# Patient Record
Sex: MALE | Race: WHITE | NOT HISPANIC OR LATINO | Employment: FULL TIME | URBAN - METROPOLITAN AREA
[De-identification: names, ages, dates, MRNs, and addresses within clinical notes are randomized per-mention and may not be internally consistent; named-entity substitution may affect disease eponyms.]

---

## 2020-01-03 ENCOUNTER — CLINICAL SUPPORT (OUTPATIENT)
Dept: GASTROENTEROLOGY | Facility: CLINIC | Age: 55
End: 2020-01-03

## 2020-01-03 DIAGNOSIS — Z12.11 SCREENING FOR COLON CANCER: Primary | ICD-10-CM

## 2020-01-03 RX ORDER — AMLODIPINE BESYLATE 5 MG/1
5 TABLET ORAL DAILY
COMMUNITY

## 2020-01-03 RX ORDER — LISINOPRIL 20 MG/1
20 TABLET ORAL DAILY
COMMUNITY

## 2020-01-07 ENCOUNTER — TELEPHONE (OUTPATIENT)
Dept: GASTROENTEROLOGY | Facility: CLINIC | Age: 55
End: 2020-01-07

## 2020-01-07 NOTE — TELEPHONE ENCOUNTER
Rx not forwarded to provider; called verbal into [place] [facility]; [facility] to pt informing him I did so

## 2020-01-10 PROCEDURE — 88305 TISSUE EXAM BY PATHOLOGIST: CPT | Performed by: PATHOLOGY

## 2020-01-13 ENCOUNTER — LAB REQUISITION (OUTPATIENT)
Dept: LAB | Facility: HOSPITAL | Age: 55
End: 2020-01-13
Payer: COMMERCIAL

## 2020-01-13 DIAGNOSIS — K64.0 FIRST DEGREE HEMORRHOIDS: ICD-10-CM

## 2020-01-13 DIAGNOSIS — K62.1 RECTAL POLYP: ICD-10-CM

## 2020-01-13 DIAGNOSIS — Z85.038 PERSONAL HISTORY OF OTHER MALIGNANT NEOPLASM OF LARGE INTESTINE: ICD-10-CM

## 2020-01-22 NOTE — RESULT ENCOUNTER NOTE
Spoke with patient regarding polyp results benign    Three year recall based on his previous history

## 2021-09-30 ENCOUNTER — TELEPHONE (OUTPATIENT)
Dept: GASTROENTEROLOGY | Facility: CLINIC | Age: 56
End: 2021-09-30

## 2021-09-30 NOTE — TELEPHONE ENCOUNTER
Took call from pt to schedule colonoscopy at the request of Dr Anna Gruber for low iron count  He saw Dr Anna Gruebr yesterday in the office- I called over and asked for the note to be faxed to us for review  Patient states he will begin iron infusions 10/7  Next colon recall date is 2023  In the meantime, I have him scheduled for an office visit with Herm on 10/13  Patient aware he may need to be seen in the office to discuss, but I will send a note to OT to advise

## 2021-10-13 ENCOUNTER — OFFICE VISIT (OUTPATIENT)
Dept: GASTROENTEROLOGY | Facility: CLINIC | Age: 56
End: 2021-10-13
Payer: COMMERCIAL

## 2021-10-13 VITALS
HEIGHT: 74 IN | SYSTOLIC BLOOD PRESSURE: 128 MMHG | DIASTOLIC BLOOD PRESSURE: 82 MMHG | BODY MASS INDEX: 33.34 KG/M2 | WEIGHT: 259.8 LBS

## 2021-10-13 DIAGNOSIS — Z85.038 HISTORY OF COLON CANCER: ICD-10-CM

## 2021-10-13 DIAGNOSIS — D64.9 ANEMIA, UNSPECIFIED TYPE: Primary | ICD-10-CM

## 2021-10-13 DIAGNOSIS — Z85.038 HISTORY OF COLON CANCER: Primary | ICD-10-CM

## 2021-10-13 PROCEDURE — 99214 OFFICE O/P EST MOD 30 MIN: CPT | Performed by: NURSE PRACTITIONER

## 2021-10-13 RX ORDER — TADALAFIL 5 MG/1
TABLET ORAL
COMMUNITY
Start: 2021-09-03

## 2021-10-14 RX ORDER — SODIUM PICOSULFATE, MAGNESIUM OXIDE, AND ANHYDROUS CITRIC ACID 10; 3.5; 12 MG/160ML; G/160ML; G/160ML
LIQUID ORAL
Qty: 320 ML | Refills: 0 | Status: SHIPPED | COMMUNITY
Start: 2021-10-14 | End: 2021-11-02 | Stop reason: HOSPADM

## 2021-11-01 ENCOUNTER — ANESTHESIA EVENT (OUTPATIENT)
Dept: GASTROENTEROLOGY | Facility: AMBULATORY SURGERY CENTER | Age: 56
End: 2021-11-01

## 2021-11-02 ENCOUNTER — ANESTHESIA (OUTPATIENT)
Dept: GASTROENTEROLOGY | Facility: AMBULATORY SURGERY CENTER | Age: 56
End: 2021-11-02

## 2021-11-02 ENCOUNTER — HOSPITAL ENCOUNTER (OUTPATIENT)
Dept: GASTROENTEROLOGY | Facility: AMBULATORY SURGERY CENTER | Age: 56
Discharge: HOME | End: 2021-11-02
Payer: COMMERCIAL

## 2021-11-02 VITALS
HEART RATE: 78 BPM | DIASTOLIC BLOOD PRESSURE: 90 MMHG | WEIGHT: 249 LBS | BODY MASS INDEX: 31.95 KG/M2 | OXYGEN SATURATION: 97 % | HEIGHT: 74 IN | RESPIRATION RATE: 20 BRPM | SYSTOLIC BLOOD PRESSURE: 150 MMHG | TEMPERATURE: 98.9 F

## 2021-11-02 DIAGNOSIS — D64.9 ANEMIA, UNSPECIFIED TYPE: ICD-10-CM

## 2021-11-02 DIAGNOSIS — Z85.038 HISTORY OF COLON CANCER: ICD-10-CM

## 2021-11-02 PROCEDURE — 45378 DIAGNOSTIC COLONOSCOPY: CPT | Performed by: INTERNAL MEDICINE

## 2021-11-02 PROCEDURE — 43239 EGD BIOPSY SINGLE/MULTIPLE: CPT | Performed by: INTERNAL MEDICINE

## 2021-11-02 PROCEDURE — 88305 TISSUE EXAM BY PATHOLOGIST: CPT | Performed by: PATHOLOGY

## 2021-11-02 RX ORDER — SODIUM CHLORIDE, SODIUM LACTATE, POTASSIUM CHLORIDE, CALCIUM CHLORIDE 600; 310; 30; 20 MG/100ML; MG/100ML; MG/100ML; MG/100ML
50 INJECTION, SOLUTION INTRAVENOUS CONTINUOUS
Status: DISCONTINUED | OUTPATIENT
Start: 2021-11-02 | End: 2021-11-06 | Stop reason: HOSPADM

## 2021-11-02 RX ORDER — PROPOFOL 10 MG/ML
INJECTION, EMULSION INTRAVENOUS AS NEEDED
Status: DISCONTINUED | OUTPATIENT
Start: 2021-11-02 | End: 2021-11-02

## 2021-11-02 RX ORDER — LIDOCAINE HYDROCHLORIDE 10 MG/ML
INJECTION, SOLUTION EPIDURAL; INFILTRATION; INTRACAUDAL; PERINEURAL AS NEEDED
Status: DISCONTINUED | OUTPATIENT
Start: 2021-11-02 | End: 2021-11-02

## 2021-11-02 RX ORDER — PANTOPRAZOLE SODIUM 40 MG/1
40 TABLET, DELAYED RELEASE ORAL DAILY
Qty: 30 TABLET | Refills: 5 | Status: SHIPPED | OUTPATIENT
Start: 2021-11-02 | End: 2022-01-25

## 2021-11-02 RX ADMIN — PROPOFOL 50 MG: 10 INJECTION, EMULSION INTRAVENOUS at 09:15

## 2021-11-02 RX ADMIN — PROPOFOL 50 MG: 10 INJECTION, EMULSION INTRAVENOUS at 09:17

## 2021-11-02 RX ADMIN — PROPOFOL 50 MG: 10 INJECTION, EMULSION INTRAVENOUS at 09:30

## 2021-11-02 RX ADMIN — LIDOCAINE HYDROCHLORIDE 100 MG: 10 INJECTION, SOLUTION EPIDURAL; INFILTRATION; INTRACAUDAL; PERINEURAL at 09:12

## 2021-11-02 RX ADMIN — PROPOFOL 40 MG: 10 INJECTION, EMULSION INTRAVENOUS at 09:25

## 2021-11-02 RX ADMIN — PROPOFOL 200 MG: 10 INJECTION, EMULSION INTRAVENOUS at 09:12

## 2021-11-02 RX ADMIN — SODIUM CHLORIDE, SODIUM LACTATE, POTASSIUM CHLORIDE, CALCIUM CHLORIDE 50 ML/HR: 600; 310; 30; 20 INJECTION, SOLUTION INTRAVENOUS at 08:55

## 2021-11-02 RX ADMIN — PROPOFOL 50 MG: 10 INJECTION, EMULSION INTRAVENOUS at 09:19

## 2021-11-02 RX ADMIN — PROPOFOL 50 MG: 10 INJECTION, EMULSION INTRAVENOUS at 09:23

## 2022-01-06 ENCOUNTER — OFFICE VISIT (OUTPATIENT)
Dept: GASTROENTEROLOGY | Facility: CLINIC | Age: 57
End: 2022-01-06
Payer: COMMERCIAL

## 2022-01-06 VITALS
DIASTOLIC BLOOD PRESSURE: 100 MMHG | HEIGHT: 74 IN | BODY MASS INDEX: 33.88 KG/M2 | WEIGHT: 264 LBS | SYSTOLIC BLOOD PRESSURE: 164 MMHG

## 2022-01-06 DIAGNOSIS — K22.70 BARRETT'S ESOPHAGUS WITHOUT DYSPLASIA: ICD-10-CM

## 2022-01-06 DIAGNOSIS — Z85.038 PERSONAL HISTORY OF COLON CANCER: Primary | ICD-10-CM

## 2022-01-06 PROCEDURE — 99214 OFFICE O/P EST MOD 30 MIN: CPT | Performed by: REGISTERED NURSE

## 2022-01-06 NOTE — PROGRESS NOTES
[address] [facility] - Outpatient Follow-up Note  Isiah Toledo 61 y o  male MRN: [id number]  Encounter: [id number]    ASSESSMENT AND PLAN:      1  Reeder's esophagus without dysplasia  Diagnosed on  11/02/2021  Reflux symptoms controlled on pantoprazole 40 mg daily  Patient to continue taking  Three year recall on EGD  Due  11/2024     2  Personal history of colon cancer  Diagnosed in 2019  Status post resection as well as chemo  Patient is doing well  Last colonoscopy 2021 negative for any colon polyps  Three year recall  Due 11/2020 for the time of EGD    3  Anemia  Followed by Dr Sigrid Waters at [address] [facility]  He was referred here for colonoscopy and upper endoscopy due to drop in hemoglobin and ferritin  He is currently receiving iron transfusions  Next lab draw in February  He will have his provider [facility] on the results and the CT scan  Followup Appointment:  6 months  ______________________________________________________________________    Chief Complaint   Patient presents with    Follow-up     med check     HPI:  58-year-old male here for med check  He was started on pantoprazole when diagnosed with Reeder's esophagus  Patient was curious if he should continue the medication  I explained to him that it is important to continue this medication to prevent further acid reflux which could cause dysplasia  He understands and is in agreement  GERD symptoms are in control right now with no breakthrough symptoms  Of note patient also has a personal history of colon cancer  No concerns at this time  Daily formed bowel movements  Denies any blood in his stool  Denies any abdominal pain  His appetite is stable and he has not experienced any significant weight loss  He also has a history of anemia and is followed by Dr Sigrid Waters at [address] [facility]    We performed an upper and lower endoscopy which were negative for any GI bleed  He is receiving iron infusions and will be getting his next lab and CT scan done in February or March  He will have us cc'd on the results  Historical Information   Past Medical History:   Diagnosis Date    Colon cancer ([facility] )     Colon polyp     Diabetes mellitus ([facility] )     Hypertension      Past Surgical History:   Procedure Laterality Date    COLON SIGMOID RESECTION      COLON SURGERY      COLONOSCOPY  01/08/2019    WISDOM TOOTH EXTRACTION       Social History     Substance and Sexual Activity   Alcohol Use Yes    Alcohol/week: 2 0 standard drinks    Types: 2 Cans of beer per week     Social History     Substance and Sexual Activity   Drug Use Never     Social History     Tobacco Use   Smoking Status Never Smoker   Smokeless Tobacco Never Used     Family History   Problem Relation Age of Onset    No Known Problems Mother     No Known Problems Father          Current Outpatient Medications:     amLODIPine (NORVASC) 5 mg tablet    lisinopril (ZESTRIL) 20 mg tablet    metFORMIN (GLUCOPHAGE) 1000 MG tablet    pantoprazole (PROTONIX) 40 mg tablet    tadalafil (CIALIS) 5 MG tablet  No Known Allergies  Reviewed medications and allergies and updated as indicated    PHYSICAL EXAM:    Blood pressure 164/100, height 6' 2" (1 88 m), weight 120 kg (264 lb)  Body mass index is 33 9 kg/m²  General Appearance: NAD, cooperative, alert  Eyes: Anicteric, PERRLA, EOMI  ENT:  Normocephalic, atraumatic, normal mucosa  Neck:  Supple, symmetrical, trachea midline  Resp:  Clear to auscultation bilaterally; no rales, rhonchi or wheezing; respirations unlabored   CV:  S1 S2, Regular rate and rhythm; no murmur, rub, or gallop  GI:  Soft, non-tender, non-distended; normal bowel sounds; no masses, no organomegaly   Rectal: Deferred  Musculoskeletal: No cyanosis, clubbing or edema  Normal ROM    Skin:  No jaundice, rashes, or lesions   Heme/Lymph: No palpable cervical lymphadenopathy  Psych: Normal affect, good eye contact  Neuro: No gross deficits, AAOx3    Lab Results:   No results found for: WBC, HGB, HCT, MCV, PLT  No results found for: NA, K, CL, CO2, ANIONGAP, BUN, CREATININE, GLUCOSE, GLUF, CALCIUM, CORRECTEDCA, AST, ALT, ALKPHOS, PROT, BILITOT, EGFR  No results found for: IRON, TIBC, FERRITIN  No results found for: LIPASE    Radiology Results:   No results found

## 2022-01-25 DIAGNOSIS — D64.9 ANEMIA, UNSPECIFIED TYPE: ICD-10-CM

## 2022-01-25 RX ORDER — PANTOPRAZOLE SODIUM 40 MG/1
40 TABLET, DELAYED RELEASE ORAL DAILY
Qty: 90 TABLET | Refills: 3 | Status: SHIPPED | OUTPATIENT
Start: 2022-01-25

## 2022-01-25 NOTE — TELEPHONE ENCOUNTER
Pt calling to request refill of Pantoprazole 40 to [facility]; has been getting 1-mo supply but requests 3-mo supply for considerable savings  If Nidia Meo # [id number]

## 2022-06-06 LAB
CREAT ?TM UR-SCNC: 166 UMOL/L
EXT MICROALBUMIN URINE RANDOM: 2
HBA1C MFR BLD HPLC: 6.1 %
MICROALBUMIN/CREAT UR: 12 MG/G{CREAT}

## 2022-07-25 ENCOUNTER — OFFICE VISIT (OUTPATIENT)
Dept: GASTROENTEROLOGY | Facility: CLINIC | Age: 57
End: 2022-07-25
Payer: COMMERCIAL

## 2022-07-25 VITALS
WEIGHT: 254.4 LBS | DIASTOLIC BLOOD PRESSURE: 80 MMHG | HEIGHT: 74 IN | BODY MASS INDEX: 32.65 KG/M2 | SYSTOLIC BLOOD PRESSURE: 142 MMHG

## 2022-07-25 DIAGNOSIS — K22.719 BARRETT'S ESOPHAGUS WITH DYSPLASIA: Primary | ICD-10-CM

## 2022-07-25 DIAGNOSIS — Z85.038 HISTORY OF MALIGNANT NEOPLASM OF COLON: ICD-10-CM

## 2022-07-25 DIAGNOSIS — D64.9 ANEMIA, UNSPECIFIED TYPE: ICD-10-CM

## 2022-07-25 PROBLEM — K22.70 BARRETT'S ESOPHAGUS: Status: ACTIVE | Noted: 2022-07-25

## 2022-07-25 PROCEDURE — 99213 OFFICE O/P EST LOW 20 MIN: CPT | Performed by: REGISTERED NURSE

## 2022-07-25 RX ORDER — ATORVASTATIN CALCIUM 10 MG/1
10 TABLET, FILM COATED ORAL DAILY
COMMUNITY
Start: 2022-05-11

## 2022-07-25 NOTE — PROGRESS NOTES
[facility] - Outpatient Follow-up Note  Ambrosio Monteiro 61 y o  male MRN: [id number]  Encounter: [id number]    ASSESSMENT AND PLAN:      1  Reeder's esophagus without dysplasia  History of Reeder's esophagus diagnosed on EGD 11/2021  Reflux symptoms well controlled on pantoprazole 40 mg daily  Three year recall due for EGD, 11/2024  -continue pantoprazole 40 mg daily    2  History of malignant neoplasm of colon  Diagnosed in 2019  Status post resection as well as chemo  Patient is doing well  Last colonoscopy 2021 negative for any colon polyps  three year recall  -next colonoscopy due 11/2024    3  Anemia, unspecified type  Patient is followed by Dr Carlotta Hull at [address] [place] [address] Hematology  He has received 2 iron transfusions in the past   CBC and iron panel from March/2022 within normal limits  He will continue to follow with him every 6 months  Followup Appointment:  1 year  ______________________________________________________________________    Chief Complaint   Patient presents with    GERD     Follow up     HPI:  65-year-old male presents for follow-up  He was started on pantoprazole when diagnosed with Reeder's esophagus last year  Symptoms have been well controlled with once daily dosing  He denies any dysphagia or epigastric pain  He does have a personal history of colon cancer status post resection and chemo  Denies any changes in his bowel habits  Denies any rectal bleeding  Denies any abdominal pain  His appetite is good and his weight is stable      Historical Information   Past Medical History:   Diagnosis Date    Colon cancer ([facility] )     Colon polyp     Diabetes mellitus ([facility] )     Hyperlipidemia     Hypertension      Past Surgical History:   Procedure Laterality Date    COLON SIGMOID RESECTION      COLON SURGERY      COLONOSCOPY  01/08/2019    WISDOM TOOTH EXTRACTION       Social History     Substance and Sexual Activity   Alcohol Use Yes    Alcohol/week: 2 0 standard drinks    Types: 2 Cans of beer per week     Social History     Substance and Sexual Activity   Drug Use Never     Social History     Tobacco Use   Smoking Status Never Smoker   Smokeless Tobacco Never Used     Family History   Problem Relation Age of Onset    No Known Problems Mother     No Known Problems Father          Current Outpatient Medications:     amLODIPine (NORVASC) 5 mg tablet    atorvastatin (LIPITOR) 10 mg tablet    lisinopril (ZESTRIL) 20 mg tablet    metFORMIN (GLUCOPHAGE) 1000 MG tablet    pantoprazole (PROTONIX) 40 mg tablet    tadalafil (CIALIS) 5 MG tablet  No Known Allergies  Reviewed medications and allergies and updated as indicated    PHYSICAL EXAM:    Blood pressure 142/80, height 6' 2" (1 88 m), weight 115 kg (254 lb 6 4 oz)  Body mass index is 32 66 kg/m²  General Appearance: NAD, cooperative, alert  Eyes: Anicteric, PERRLA, EOMI  ENT:  Normocephalic, atraumatic, normal mucosa  Neck:  Supple, symmetrical, trachea midline  Resp:  Clear to auscultation bilaterally; no rales, rhonchi or wheezing; respirations unlabored   CV:  S1 S2, Regular rate and rhythm; no murmur, rub, or gallop  GI:  Soft, non-tender, non-distended; normal bowel sounds; no masses, no organomegaly   Rectal: Deferred  Musculoskeletal: No cyanosis, clubbing or edema  Normal ROM  Skin:  No jaundice, rashes, or lesions   Heme/Lymph: No palpable cervical lymphadenopathy  Psych: Normal affect, good eye contact  Neuro: No gross deficits, AAOx3    Lab Results:   Reviewed     Radiology Results:   No results found

## 2023-01-09 DIAGNOSIS — D64.9 ANEMIA, UNSPECIFIED TYPE: ICD-10-CM

## 2023-01-09 RX ORDER — PANTOPRAZOLE SODIUM 40 MG/1
TABLET, DELAYED RELEASE ORAL
Qty: 90 TABLET | Refills: 0 | Status: SHIPPED | OUTPATIENT
Start: 2023-01-09

## 2023-07-03 DIAGNOSIS — D64.9 ANEMIA, UNSPECIFIED TYPE: ICD-10-CM

## 2023-07-03 RX ORDER — PANTOPRAZOLE SODIUM 40 MG/1
TABLET, DELAYED RELEASE ORAL
Qty: 90 TABLET | Refills: 0 | Status: SHIPPED | OUTPATIENT
Start: 2023-07-03

## 2023-10-11 DIAGNOSIS — D64.9 ANEMIA, UNSPECIFIED TYPE: ICD-10-CM

## 2023-10-11 RX ORDER — PANTOPRAZOLE SODIUM 40 MG/1
TABLET, DELAYED RELEASE ORAL
Qty: 30 TABLET | Refills: 0 | Status: SHIPPED | OUTPATIENT
Start: 2023-10-11 | End: 2023-10-20 | Stop reason: SDUPTHER

## 2023-10-20 ENCOUNTER — OFFICE VISIT (OUTPATIENT)
Age: 58
End: 2023-10-20
Payer: COMMERCIAL

## 2023-10-20 VITALS
BODY MASS INDEX: 34.7 KG/M2 | DIASTOLIC BLOOD PRESSURE: 98 MMHG | SYSTOLIC BLOOD PRESSURE: 162 MMHG | HEIGHT: 74 IN | WEIGHT: 270.4 LBS

## 2023-10-20 DIAGNOSIS — Z85.038 HISTORY OF MALIGNANT NEOPLASM OF COLON: ICD-10-CM

## 2023-10-20 DIAGNOSIS — K22.70 BARRETT'S ESOPHAGUS WITHOUT DYSPLASIA: Primary | ICD-10-CM

## 2023-10-20 DIAGNOSIS — D64.9 ANEMIA, UNSPECIFIED TYPE: ICD-10-CM

## 2023-10-20 PROCEDURE — 99214 OFFICE O/P EST MOD 30 MIN: CPT | Performed by: NURSE PRACTITIONER

## 2023-10-20 RX ORDER — PANTOPRAZOLE SODIUM 40 MG/1
40 TABLET, DELAYED RELEASE ORAL DAILY
Qty: 90 TABLET | Refills: 3 | Status: SHIPPED | OUTPATIENT
Start: 2023-10-20

## 2023-10-20 NOTE — PROGRESS NOTES
[facility] - Outpatient Follow-up Note  Fernanda Lai 60 y.o. male MRN: [id number]  Encounter: [id number]    ASSESSMENT AND PLAN:      1. Reeder's esophagus without dysplasia  2. GERD  Reflux symptoms well controlled on pantoprazole 40 mg daily  Denies breakthrough symptoms. No chest pain or epigastric pain  No alarm symptoms  EGD 11/2021 revealed erosive esophagitis, biopsies positive for Reeder's  -Continue pantoprazole 40 mg daily, refills provided  -Discussed GERD diet and lifestyle modifications  -Due for EGD recall November 2024    3. History of malignant neoplasm of colon  Diagnosed with colon cancer 2019, s/p resection and adjuvant chemotherapy  Follows with oncology for surveillance which has been negative since surgery  Colonoscopy 11/2021 was normal, 3-year recall recommended  -Due for surveillance colonoscopy November 2024      Followup Appointment: 1 year ______________________________________________________________________    Chief Complaint   Patient presents with    Follow-up     Pt is here for annual follow up and medication review, pantoprazole works well, symptoms under control. Pt noted 2 weeks ago he forgot his pantoprazole while away and took Nexium for 3 days. HPI: Presents in follow-up for history of GERD, Reeder's esophagus and colon cancer. Overall is feeling well and offers no complaints today. He is currently on pantoprazole 40 mg daily with good control of reflux symptoms. Denies breakthrough symptoms. No dysphagia, nausea or vomiting. Appetite is good and his weight has been stable  EGD 11/2021 showed moderate erosive esophagitis in the lower third of the esophagus, biopsy positive for Reeder's    He has history of colon cancer diagnosed in 2019.   S/p resection and adjuvant chemotherapy  Last colonoscopy 11/2021 was negative for polyps or lesion  Continues to follow with oncology with periodic scans and lab tests which have been negative  Denies recent change in bowel habits. Occasional diarrhea which he attributes to metformin  Usually with formed brown stool daily. Denies melena, hematochezia or rectal bleeding    Historical Information   Past Medical History:   Diagnosis Date    Colon cancer ([address])     Colon polyp     Diabetes mellitus ([address])     Hernia Jan 2019    Hyperlipidemia     Hypertension      Past Surgical History:   Procedure Laterality Date    ABDOMINAL SURGERY  Jan 2019    COLON SIGMOID RESECTION      COLON SURGERY      COLONOSCOPY  01/08/2019    COLONOSCOPY  Oct 2021    WISDOM TOOTH EXTRACTION       Social History     Substance and Sexual Activity   Alcohol Use Yes    Alcohol/week: 2.0 standard drinks of alcohol    Types: 2 Cans of beer per week     Social History     Substance and Sexual Activity   Drug Use Never     Social History     Tobacco Use   Smoking Status Never   Smokeless Tobacco Never     Family History   Problem Relation Age of Onset    Diabetes Mother     No Known Problems Father          Current Outpatient Medications:     amLODIPine (NORVASC) 5 mg tablet    atorvastatin (LIPITOR) 10 mg tablet    lisinopril (ZESTRIL) 20 mg tablet    metFORMIN (GLUCOPHAGE) 1000 MG tablet    pantoprazole (PROTONIX) 40 mg tablet    tadalafil (CIALIS) 5 MG tablet  No Known Allergies  Reviewed medications and allergies and updated as indicated    PHYSICAL EXAM:    Blood pressure 162/98, height 6' 2" (1.88 m), weight 123 kg (270 lb 6.4 oz). Body mass index is 34.72 kg/m². General Appearance: NAD, cooperative, alert  Eyes: Anicteric  ENT:  Normocephalic, atraumatic, normal mucosa. Neck:  Supple, symmetrical, trachea midline  Resp:  Clear to auscultation bilaterally; no rales, rhonchi or wheezing; respirations unlabored   CV:  S1 S2, Regular rate and rhythm; no murmur, rub, or gallop.   GI:  Soft, non-tender, non-distended; normal bowel sounds; no masses, no organomegaly   Rectal: Deferred  Musculoskeletal: No cyanosis, clubbing or edema. Normal ROM.   Skin:  No jaundice, rashes, or lesions   Psych: Normal affect, good eye contact  Neuro: No gross deficits, AAOx3

## 2023-12-28 ENCOUNTER — TELEPHONE (OUTPATIENT)
Age: 58
End: 2023-12-28

## 2023-12-28 NOTE — TELEPHONE ENCOUNTER
PA for Pantoprazole    Submitted via  []CMM-KEY   [x]Erin-Case ID # [id number]   []Faxed to plan   []Other website      Office notes sent, clinical questions answered. Awaiting determination

## 2023-12-28 NOTE — TELEPHONE ENCOUNTER
Pt returned call and reached refill line. He was informed of the PA denial. Please try to reach him again to discuss next steps.

## 2023-12-28 NOTE — TELEPHONE ENCOUNTER
PA for Pantoprazole Denied    Reason:                  Message sent to clinical pool No      Denial letter scanned into Media Yes    Appeal started No

## 2023-12-29 NOTE — TELEPHONE ENCOUNTER
Spoke to patient.  He had already investigated [facility] and is planning on using that.  Will call if he needs prescription.

## 2024-01-04 DIAGNOSIS — D64.9 ANEMIA, UNSPECIFIED TYPE: ICD-10-CM

## 2024-01-05 DIAGNOSIS — D64.9 ANEMIA, UNSPECIFIED TYPE: ICD-10-CM

## 2024-01-05 RX ORDER — PANTOPRAZOLE SODIUM 40 MG/1
40 TABLET, DELAYED RELEASE ORAL DAILY
Qty: 90 TABLET | Refills: 1 | Status: SHIPPED | OUTPATIENT
Start: 2024-01-05

## 2024-01-05 RX ORDER — PANTOPRAZOLE SODIUM 40 MG/1
40 TABLET, DELAYED RELEASE ORAL DAILY
Qty: 90 TABLET | Refills: 0 | OUTPATIENT
Start: 2024-01-05

## 2024-01-05 NOTE — TELEPHONE ENCOUNTER
Reason for call: NOT DUPLICATE pt says he does NOT need a PRIOR AUTH he will be paying out of pocket and use [facility]! He asked [facility] to send a script to us and it was denied.   Please note. NOT DUPLICATE!  [x] Refill   [] Prior Auth  [] Other:     Office:   [] PCP/Provider -   [x] Specialty/Provider - Hipolito CNRP    Medication: pantoprazole    Dose/Frequency: 40 mg/ daily    Quantity: 90D w refill    Pharmacy: [facility]     Does the patient have enough for 3 days?   [] Yes   [x] No - Send as HP to POD

## 2024-05-03 LAB
CREAT ?TM UR-SCNC: 121 UMOL/L
EXT ALBUMIN URINE RANDOM: 4.6
HBA1C MFR BLD HPLC: 9.2 %
MICROALBUMIN/CREAT UR: 38 MG/G{CREAT}

## 2024-05-22 ENCOUNTER — OFFICE VISIT (OUTPATIENT)
Age: 59
End: 2024-05-22
Payer: COMMERCIAL

## 2024-05-22 VITALS
DIASTOLIC BLOOD PRESSURE: 88 MMHG | BODY MASS INDEX: 34.55 KG/M2 | SYSTOLIC BLOOD PRESSURE: 152 MMHG | WEIGHT: 269.2 LBS | HEIGHT: 74 IN

## 2024-05-22 DIAGNOSIS — D64.9 ANEMIA, UNSPECIFIED TYPE: ICD-10-CM

## 2024-05-22 DIAGNOSIS — K22.70 BARRETT'S ESOPHAGUS WITHOUT DYSPLASIA: Primary | ICD-10-CM

## 2024-05-22 DIAGNOSIS — Z85.038 HISTORY OF MALIGNANT NEOPLASM OF COLON: ICD-10-CM

## 2024-05-22 PROCEDURE — 99214 OFFICE O/P EST MOD 30 MIN: CPT | Performed by: NURSE PRACTITIONER

## 2024-05-22 RX ORDER — PANTOPRAZOLE SODIUM 40 MG/1
40 TABLET, DELAYED RELEASE ORAL DAILY
Qty: 90 TABLET | Refills: 3 | Status: SHIPPED | OUTPATIENT
Start: 2024-05-22

## 2024-05-22 RX ORDER — NEBIVOLOL 10 MG/1
1 TABLET ORAL DAILY
COMMUNITY

## 2024-05-22 NOTE — LETTER
May 23, 2024     BRANDAN Patel  [address] [place] [address]    Patient: Patrick Willoughby   YOB: 1965   Date of Visit: 5/22/2024       Dear Dr. Alcantara:    Thank you for referring Patrick Willoughby to me for evaluation. Below are my notes for this consultation.    If you have questions, please do not hesitate to call me. I look forward to following your patient along with you.         Sincerely,        BRANDAN Kirk        CC: No Recipients    BRANDAN Kirk  5/22/2024  2:10 PM  Addendum  [facility] - Outpatient Follow-up Note  Patrick Willoughby 58 y.o. male MRN: [id number]  Encounter: [id number]    ASSESSMENT AND PLAN:      1. Reeder's esophagus without dysplasia  2.  GERD  EGD 11/21 revealed erosive esophagitis, biopsy positive for Reeder's  Remains on pantoprazole 40 mg daily  GERD symptoms well-controlled, no breakthrough symptoms, no alarm symptoms  EGD recall recommended in 3 years  -Scheduled for EGD at [facility]  -Continue pantoprazole 40 mg daily, refill provided  -Reviewed GERD diet and lifestyle modifications    3. History of malignant neoplasm of colon  Diagnosed in 2019, underwent left hemicolectomy and adjuvant chemotherapy  Continues to follow with [facility] for surveillance which has been negative  Colonoscopy 11/2021 normal, recall recommended in November 2024  Recent CEA slightly elevated 3.2, previously 2.8  Hemoglobin decreased to 11.9 from 13.0 in November 2023  Oncology recommending surveillance colonoscopy  -Scheduled for colonoscopy at [facility]      Followup Appointment: 1 year  ______________________________________________________________________    Chief Complaint   Patient presents with   • Follow-up     Pt states he needs to schedule screening EGD/Colonoscopy per Dr. Lezama due to increased CEA. Pt needs refill on pantoprazole, working well.      HPI: Patient presents in follow-up for history  of Reeder's esophagus, GERD and colon cancer diagnosed in 2019, s/p surgical resection and adjuvant chemotherapy    He continues to follow with [facility] oncology.  Recent office visit and labs revealed slight increase in his CEA to 3.2 from 2.9 in November 2023.    Hemoglobin also noted to be decreased at 11.9, previously 13.0 in November 2023.  Most recent CT scan November 2023 was negative for acute findings    Patient denies recent acute change in bowel habits but does notice that he is having more frequent episodes of diarrhea 2-3 times per week.  Questions that may be due to medication specifically metformin.  Denies constipation, melena.  He does have occasional bright red blood with wiping which he attributes to hemorrhoids  No abdominal pain    Reports good control of reflux symptoms on Protonix  Denies breakthrough GERD symptoms.  Appetite is good and his weight has been stable    Historical Information  Past Medical History:   Diagnosis Date   • Colon cancer (HCC)    • Colon polyp    • Diabetes mellitus ([facility]CC)    • Hernia Jan 2019   • Hyperlipidemia    • Hypertension      Past Surgical History:   Procedure Laterality Date   • ABDOMINAL SURGERY  Jan 2019   • COLON SIGMOID RESECTION     • COLON SURGERY     • COLONOSCOPY  01/08/2019   • COLONOSCOPY  Oct 2021   • WISDOM TOOTH EXTRACTION       Social History     Substance and Sexual Activity   Alcohol Use Yes   • Alcohol/week: 2.0 standard drinks of alcohol   • Types: 2 Cans of beer per week     Social History     Substance and Sexual Activity   Drug Use Never     Social History     Tobacco Use   Smoking Status Never   • Passive exposure: Never   Smokeless Tobacco Never     Family History   Problem Relation Age of Onset   • Diabetes Mother    • No Known Problems Father          Current Outpatient Medications:   •  amLODIPine (NORVASC) 5 mg tablet  •  atorvastatin (LIPITOR) 10 mg tablet  •  lisinopril (ZESTRIL) 20 mg tablet  •  metFORMIN (GLUCOPHAGE) 1000 MG  "tablet  •  nebivolol (BYSTOLIC) 10 mg tablet  •  pantoprazole (PROTONIX) 40 mg tablet  •  tadalafil (CIALIS) 5 MG tablet  No Known Allergies  Reviewed medications and allergies and updated as indicated    PHYSICAL EXAM:    Blood pressure 152/88, height 6' 2\" (1.88 m), weight 122 kg (269 lb 3.2 oz). Body mass index is 34.56 kg/m².  General Appearance: NAD, cooperative, alert  Eyes: Anicteric  ENT:  Normocephalic, atraumatic, normal mucosa.    Neck:  Supple, symmetrical, trachea midline  Resp:  Clear to auscultation bilaterally; no rales, rhonchi or wheezing; respirations unlabored   CV:  S1 S2, Regular rate and rhythm; no murmur, rub, or gallop.  GI:  Soft, non-tender, non-distended; normal bowel sounds; no masses, no organomegaly   Rectal: Deferred  Musculoskeletal: No cyanosis, clubbing or edema. Normal ROM.  Skin:  No jaundice, rashes, or lesions   Psych: Normal affect, good eye contact  Neuro: No gross deficits, AAOx3      "

## 2024-05-22 NOTE — PROGRESS NOTES
[facility] - Outpatient Follow-up Note  Patrick Willoughby 58 y.o. male MRN: [id number]  Encounter: [id number]    ASSESSMENT AND PLAN:      1. Reeder's esophagus without dysplasia  2.  GERD  EGD 11/21 revealed erosive esophagitis, biopsy positive for Reeder's  Remains on pantoprazole 40 mg daily  GERD symptoms well-controlled, no breakthrough symptoms, no alarm symptoms  EGD recall recommended in 3 years  -Scheduled for EGD at [facility]  -Continue pantoprazole 40 mg daily, refill provided  -Reviewed GERD diet and lifestyle modifications    3. History of malignant neoplasm of colon  Diagnosed in 2019, underwent left hemicolectomy and adjuvant chemotherapy  Continues to follow with [facility] oncology for surveillance which has been negative  Colonoscopy 11/2021 normal, recall recommended in November 2024  Recent CEA slightly elevated 3.2, previously 2.8  Hemoglobin decreased to 11.9 from 13.0 in November 2023  Oncology recommending surveillance colonoscopy  -Scheduled for colonoscopy at [facility]      Followup Appointment: 1 year  ______________________________________________________________________    Chief Complaint   Patient presents with    Follow-up     Pt states he needs to schedule screening EGD/Colonoscopy per Dr. Lezama due to increased CEA. Pt needs refill on pantoprazole, working well.      HPI: Patient presents in follow-up for history of Reeder's esophagus, GERD and colon cancer diagnosed in 2019, s/p surgical resection and adjuvant chemotherapy    He continues to follow with [facility] oncology.  Recent office visit and labs revealed slight increase in his CEA to 3.2 from 2.9 in November 2023.    Hemoglobin also noted to be decreased at 11.9, previously 13.0 in November 2023.  Most recent CT scan November 2023 was negative for acute findings    Patient denies recent acute change in bowel habits but does notice that he is having more frequent episodes of diarrhea  "2-3 times per week.  Questions that may be due to medication specifically metformin.  Denies constipation, melena.  He does have occasional bright red blood with wiping which he attributes to hemorrhoids  No abdominal pain    Reports good control of reflux symptoms on Protonix  Denies breakthrough GERD symptoms.  Appetite is good and his weight has been stable    Historical Information   Past Medical History:   Diagnosis Date    Colon cancer (HCC)     Colon polyp     Diabetes mellitus (HCC)     Hernia Jan 2019    Hyperlipidemia     Hypertension      Past Surgical History:   Procedure Laterality Date    ABDOMINAL SURGERY  Jan 2019    COLON SIGMOID RESECTION      COLON SURGERY      COLONOSCOPY  01/08/2019    COLONOSCOPY  Oct 2021    WISDOM TOOTH EXTRACTION       Social History     Substance and Sexual Activity   Alcohol Use Yes    Alcohol/week: 2.0 standard drinks of alcohol    Types: 2 Cans of beer per week     Social History     Substance and Sexual Activity   Drug Use Never     Social History     Tobacco Use   Smoking Status Never    Passive exposure: Never   Smokeless Tobacco Never     Family History   Problem Relation Age of Onset    Diabetes Mother     No Known Problems Father          Current Outpatient Medications:     amLODIPine (NORVASC) 5 mg tablet    atorvastatin (LIPITOR) 10 mg tablet    lisinopril (ZESTRIL) 20 mg tablet    metFORMIN (GLUCOPHAGE) 1000 MG tablet    nebivolol (BYSTOLIC) 10 mg tablet    pantoprazole (PROTONIX) 40 mg tablet    tadalafil (CIALIS) 5 MG tablet  No Known Allergies  Reviewed medications and allergies and updated as indicated    PHYSICAL EXAM:    Blood pressure 152/88, height 6' 2\" (1.88 m), weight 122 kg (269 lb 3.2 oz). Body mass index is 34.56 kg/m².  General Appearance: NAD, cooperative, alert  Eyes: Anicteric  ENT:  Normocephalic, atraumatic, normal mucosa.    Neck:  Supple, symmetrical, trachea midline  Resp:  Clear to auscultation bilaterally; no rales, rhonchi or wheezing; " respirations unlabored   CV:  S1 S2, Regular rate and rhythm; no murmur, rub, or gallop.  GI:  Soft, non-tender, non-distended; normal bowel sounds; no masses, no organomegaly   Rectal: Deferred  Musculoskeletal: No cyanosis, clubbing or edema. Normal ROM.  Skin:  No jaundice, rashes, or lesions   Psych: Normal affect, good eye contact  Neuro: No gross deficits, AAOx3

## 2024-06-12 ENCOUNTER — ANESTHESIA (OUTPATIENT)
Dept: ANESTHESIOLOGY | Facility: AMBULATORY SURGERY CENTER | Age: 59
End: 2024-06-12

## 2024-06-12 ENCOUNTER — TELEPHONE (OUTPATIENT)
Dept: GASTROENTEROLOGY | Facility: CLINIC | Age: 59
End: 2024-06-12

## 2024-06-12 ENCOUNTER — ANESTHESIA EVENT (OUTPATIENT)
Dept: ANESTHESIOLOGY | Facility: AMBULATORY SURGERY CENTER | Age: 59
End: 2024-06-12

## 2024-06-12 NOTE — TELEPHONE ENCOUNTER
Procedure confirmed  Colonoscopy/Endoscopy     Via: Voice mail    Instructions given: Given to Patient at Visit     Prep Given: Miralax/Dulcolax    Call the office if there are any questions.      Also LVM for pt to confirm bowel prep, as it is not documented in his chart.

## 2024-06-26 ENCOUNTER — ANESTHESIA EVENT (OUTPATIENT)
Dept: GASTROENTEROLOGY | Facility: AMBULATORY SURGERY CENTER | Age: 59
End: 2024-06-26

## 2024-06-26 ENCOUNTER — ANESTHESIA (OUTPATIENT)
Dept: GASTROENTEROLOGY | Facility: AMBULATORY SURGERY CENTER | Age: 59
End: 2024-06-26

## 2024-06-26 ENCOUNTER — HOSPITAL ENCOUNTER (OUTPATIENT)
Dept: GASTROENTEROLOGY | Facility: AMBULATORY SURGERY CENTER | Age: 59
Discharge: HOME | End: 2024-06-26
Payer: COMMERCIAL

## 2024-06-26 VITALS
TEMPERATURE: 97.6 F | SYSTOLIC BLOOD PRESSURE: 126 MMHG | HEART RATE: 54 BPM | WEIGHT: 262 LBS | BODY MASS INDEX: 33.62 KG/M2 | HEIGHT: 74 IN | OXYGEN SATURATION: 98 % | DIASTOLIC BLOOD PRESSURE: 63 MMHG | RESPIRATION RATE: 14 BRPM

## 2024-06-26 DIAGNOSIS — K22.70 BARRETT'S ESOPHAGUS WITHOUT DYSPLASIA: ICD-10-CM

## 2024-06-26 DIAGNOSIS — Z85.038 HISTORY OF MALIGNANT NEOPLASM OF COLON: ICD-10-CM

## 2024-06-26 PROCEDURE — 88313 SPECIAL STAINS GROUP 2: CPT | Performed by: STUDENT IN AN ORGANIZED HEALTH CARE EDUCATION/TRAINING PROGRAM

## 2024-06-26 PROCEDURE — 88305 TISSUE EXAM BY PATHOLOGIST: CPT | Performed by: STUDENT IN AN ORGANIZED HEALTH CARE EDUCATION/TRAINING PROGRAM

## 2024-06-26 PROCEDURE — 43239 EGD BIOPSY SINGLE/MULTIPLE: CPT | Performed by: INTERNAL MEDICINE

## 2024-06-26 PROCEDURE — 45385 COLONOSCOPY W/LESION REMOVAL: CPT | Performed by: INTERNAL MEDICINE

## 2024-06-26 RX ORDER — SODIUM CHLORIDE, SODIUM LACTATE, POTASSIUM CHLORIDE, CALCIUM CHLORIDE 600; 310; 30; 20 MG/100ML; MG/100ML; MG/100ML; MG/100ML
50 INJECTION, SOLUTION INTRAVENOUS CONTINUOUS
Status: DISCONTINUED | OUTPATIENT
Start: 2024-06-26 | End: 2024-06-30 | Stop reason: HOSPADM

## 2024-06-26 RX ORDER — PROPOFOL 10 MG/ML
INJECTION, EMULSION INTRAVENOUS AS NEEDED
Status: DISCONTINUED | OUTPATIENT
Start: 2024-06-26 | End: 2024-06-26

## 2024-06-26 RX ADMIN — SODIUM CHLORIDE, SODIUM LACTATE, POTASSIUM CHLORIDE, CALCIUM CHLORIDE 50 ML/HR: 600; 310; 30; 20 INJECTION, SOLUTION INTRAVENOUS at 07:12

## 2024-06-26 RX ADMIN — PROPOFOL 100 MG: 10 INJECTION, EMULSION INTRAVENOUS at 07:36

## 2024-06-26 RX ADMIN — PROPOFOL 100 MG: 10 INJECTION, EMULSION INTRAVENOUS at 07:45

## 2024-06-26 RX ADMIN — PROPOFOL 100 MG: 10 INJECTION, EMULSION INTRAVENOUS at 07:38

## 2024-06-26 RX ADMIN — SODIUM CHLORIDE, SODIUM LACTATE, POTASSIUM CHLORIDE, CALCIUM CHLORIDE: 600; 310; 30; 20 INJECTION, SOLUTION INTRAVENOUS at 08:01

## 2024-06-26 RX ADMIN — PROPOFOL 100 MG: 10 INJECTION, EMULSION INTRAVENOUS at 07:35

## 2024-06-26 RX ADMIN — PROPOFOL 100 MG: 10 INJECTION, EMULSION INTRAVENOUS at 07:41

## 2024-06-26 RX ADMIN — PROPOFOL 100 MG: 10 INJECTION, EMULSION INTRAVENOUS at 07:52

## 2024-06-26 NOTE — H&P
"History and Physical - [facility]  Patrick Willoughby 59 y.o. male MRN: [id number]    HPI: Patrick Willoughby is a 59 y.o. year old male who presents for EGD colonoscopy for Reeder's and history of colon cancer no symptoms.    REVIEW OF SYSTEMS: Per the HPI, and otherwise unremarkable.    Historical Information   Past Medical History:   Diagnosis Date    Colon cancer (HCC)     Colon polyp     Diabetes mellitus (HCC)     GERD (gastroesophageal reflux disease)     Hernia Jan 2019    Hyperlipidemia     Hypertension      Past Surgical History:   Procedure Laterality Date    ABDOMINAL SURGERY  Jan 2019    COLON SIGMOID RESECTION      COLON SURGERY      COLONOSCOPY  01/08/2019    COLONOSCOPY  Oct 2021    WISDOM TOOTH EXTRACTION      WRIST SURGERY Left     2019     Social History   Social History     Substance and Sexual Activity   Alcohol Use Yes    Alcohol/week: 2.0 standard drinks of alcohol    Types: 2 Cans of beer per week     Social History     Substance and Sexual Activity   Drug Use Never     Social History     Tobacco Use   Smoking Status Never    Passive exposure: Never   Smokeless Tobacco Never     Family History   Problem Relation Age of Onset    Diabetes Mother     No Known Problems Father        Meds/Allergies       Current Outpatient Medications:     amLODIPine (NORVASC) 5 mg tablet    atorvastatin (LIPITOR) 10 mg tablet    lisinopril (ZESTRIL) 20 mg tablet    metFORMIN (GLUCOPHAGE) 1000 MG tablet    nebivolol (BYSTOLIC) 10 mg tablet    pantoprazole (PROTONIX) 40 mg tablet    tadalafil (CIALIS) 5 MG tablet    Current Facility-Administered Medications:     lactated ringers infusion, 50 mL/hr, Intravenous, Continuous, New Bag at 06/26/24 0801    No Known Allergies    Objective     /61   Pulse 57   Temp 97.6 °F (36.4 °C) (Temporal)   Resp (!) 26   Ht 6' 2\" (1.88 m)   Wt 119 kg (262 lb)   SpO2 97%   BMI 33.64 kg/m²     PHYSICAL EXAM    Gen: NAD AAOx3  Head: Normocephalic, " Atraumatic  CV: S1S2 RRR no m/r/g  CHEST: Clear b/l no c/r/w  ABD: soft, +BS NT/ND  EXT: no edema    ASSESSMENT/PLAN:  This is a 59 y.o. year old male here for EGD colonoscopy., and he is stable and optimized for his procedure.

## 2024-06-26 NOTE — ANESTHESIA POSTPROCEDURE EVALUATION
Post-Op Assessment Note    CV Status:  Stable  Pain Score: 0    Pain management: adequate       Mental Status:  Alert and awake   Hydration Status:  Euvolemic   PONV Controlled:  Controlled   Airway Patency:  Patent     Post Op Vitals Reviewed: Yes    No anethesia notable event occurred.    Staff: CRNA               BP   90/55   Temp   98   Pulse  51   Resp   16   SpO2   95

## 2024-06-26 NOTE — ANESTHESIA PREPROCEDURE EVALUATION
Procedure:  COLONOSCOPY  EGD    Relevant Problems   CARDIO   (+) Hyperlipidemia   (+) Hypertension      GI/HEPATIC   (+) GERD (gastroesophageal reflux disease)      Oncology   (+) History of malignant neoplasm of colon      FEN/Gastrointestinal   (+) Reeder's esophagus        Physical Exam    Airway    Mallampati score: II  TM Distance: >3 FB  Neck ROM: full     Dental   No notable dental hx     Cardiovascular      Pulmonary      Other Findings        Anesthesia Plan  ASA Score- 2     Anesthesia Type- IV sedation with anesthesia with ASA Monitors.         Additional Monitors:     Airway Plan:            Plan Factors-Exercise tolerance (METS): >4 METS.    Chart reviewed.    Patient summary reviewed.    Patient is not a current smoker.              Induction- intravenous.    Postoperative Plan-         Informed Consent- Anesthetic plan and risks discussed with patient.  I personally reviewed this patient with the CRNA. Discussed and agreed on the Anesthesia Plan with the CRNA..

## 2024-07-05 NOTE — RESULT ENCOUNTER NOTE
Colon polyp benign adenoma 3-year colonoscopy recall.  EGD actually did not show Reeder's.  Sampling error versus initial biopsies over read?  I spoke with patient.  3-year EGD recall as well

## 2024-08-16 DIAGNOSIS — D64.9 ANEMIA, UNSPECIFIED TYPE: ICD-10-CM

## 2024-08-16 RX ORDER — PANTOPRAZOLE SODIUM 40 MG/1
40 TABLET, DELAYED RELEASE ORAL DAILY
Qty: 90 TABLET | Refills: 0 | Status: SHIPPED | OUTPATIENT
Start: 2024-08-16

## 2024-08-16 NOTE — TELEPHONE ENCOUNTER
Reason for call:   [x] Refill   [] Prior Auth  [] Other:     Office:   [x] PCP/Provider -   [] Specialty/Provider -     Medication:     pantoprazole (PROTONIX) 40 mg tablet       Dose/Frequency: Take 1 tablet (40 mg total) by mouth daily     Quantity: : 90 tablet     Pharmacy: [address] [phone]     Does the patient have enough for 3 days?   [x] Yes   [] No - Send as HP to POD

## 2024-11-09 DIAGNOSIS — D64.9 ANEMIA, UNSPECIFIED TYPE: ICD-10-CM

## 2024-11-11 RX ORDER — PANTOPRAZOLE SODIUM 40 MG/1
40 TABLET, DELAYED RELEASE ORAL DAILY
Qty: 90 TABLET | Refills: 1 | Status: SHIPPED | OUTPATIENT
Start: 2024-11-11

## 2025-02-05 ENCOUNTER — TELEPHONE (OUTPATIENT)
Age: 60
End: 2025-02-05

## 2025-02-05 NOTE — TELEPHONE ENCOUNTER
PA for pantoprazole 40 mg    SUBMITTED to [employer]    via    [x]CMM-KEY:  (Key: [id number])  PA Case ID #: [id number]  Rx #: [id number]    All office notes, labs and other pertaining documents and studies sent. Clinical questions answered. Awaiting determination from insurance company.     Turnaround time for your insurance to make a decision on your Prior Authorization can take 7-21 business days.

## 2025-02-06 NOTE — TELEPHONE ENCOUNTER
PA for pantoprazole APPROVED     Date(s) approved until 12/31/2039      Patient advised by          [][employer] Message  []Phone call   []LMOM  [x]L/M to call office as no active Communication consent on file  []Unable to leave detailed message as VM not approved on Communication consent       Pharmacy advised by    [x]Fax  []Phone call    Approval letter scanned into Media Yes

## 2025-04-28 DIAGNOSIS — D64.9 ANEMIA, UNSPECIFIED TYPE: ICD-10-CM

## 2025-04-29 RX ORDER — PANTOPRAZOLE SODIUM 40 MG/1
40 TABLET, DELAYED RELEASE ORAL DAILY
Qty: 90 TABLET | Refills: 1 | Status: SHIPPED | OUTPATIENT
Start: 2025-04-29